# Patient Record
Sex: MALE | Race: BLACK OR AFRICAN AMERICAN | Employment: OTHER | URBAN - METROPOLITAN AREA
[De-identification: names, ages, dates, MRNs, and addresses within clinical notes are randomized per-mention and may not be internally consistent; named-entity substitution may affect disease eponyms.]

---

## 2017-01-08 ENCOUNTER — APPOINTMENT (OUTPATIENT)
Dept: GENERAL RADIOLOGY | Age: 82
End: 2017-01-08
Attending: EMERGENCY MEDICINE
Payer: MEDICARE

## 2017-01-08 ENCOUNTER — HOSPITAL ENCOUNTER (EMERGENCY)
Age: 82
Discharge: HOME | End: 2017-01-08
Attending: EMERGENCY MEDICINE | Admitting: EMERGENCY MEDICINE
Payer: MEDICARE

## 2017-01-08 ENCOUNTER — APPOINTMENT (OUTPATIENT)
Dept: CT IMAGING | Age: 82
End: 2017-01-08
Attending: EMERGENCY MEDICINE
Payer: MEDICARE

## 2017-01-08 VITALS
RESPIRATION RATE: 16 BRPM | TEMPERATURE: 97.8 F | DIASTOLIC BLOOD PRESSURE: 74 MMHG | HEART RATE: 72 BPM | SYSTOLIC BLOOD PRESSURE: 132 MMHG | OXYGEN SATURATION: 99 %

## 2017-01-08 DIAGNOSIS — S72.112A CLOSED AVULSION FRACTURE OF GREATER TROCHANTER OF FEMUR, LEFT, INITIAL ENCOUNTER (HCC): Primary | ICD-10-CM

## 2017-01-08 PROCEDURE — 99284 EMERGENCY DEPT VISIT MOD MDM: CPT

## 2017-01-08 PROCEDURE — 73502 X-RAY EXAM HIP UNI 2-3 VIEWS: CPT

## 2017-01-08 PROCEDURE — 73700 CT LOWER EXTREMITY W/O DYE: CPT

## 2017-01-08 PROCEDURE — 74011250637 HC RX REV CODE- 250/637: Performed by: EMERGENCY MEDICINE

## 2017-01-08 RX ORDER — HYDROCODONE BITARTRATE AND ACETAMINOPHEN 5; 325 MG/1; MG/1
1 TABLET ORAL
Status: COMPLETED | OUTPATIENT
Start: 2017-01-08 | End: 2017-01-08

## 2017-01-08 RX ORDER — OXYCODONE AND ACETAMINOPHEN 5; 325 MG/1; MG/1
1 TABLET ORAL
Qty: 30 TAB | Refills: 0 | Status: SHIPPED | OUTPATIENT
Start: 2017-01-08 | End: 2017-01-15

## 2017-01-08 RX ADMIN — HYDROCODONE BITARTRATE AND ACETAMINOPHEN 1 TABLET: 5; 325 TABLET ORAL at 15:32

## 2017-01-08 NOTE — DISCHARGE INSTRUCTIONS
Learning About How to Use a [address] Instructions  Using a walker can help you move with less pain and more stability. A walker can help you be more independent and safe as you do your daily activities. Be sure your walker fits you. When you stand up in your normal posture and rest your hands on the walker's hand , your hands should be even with the tops of your legs. Your elbows should be slightly bent. To stay safe when using a walker:  · Look straight ahead, not down at your feet. · Clear away small rugs, cords, or anything else that could cause you to trip, slip, or fall. · Be very careful around pets and small children. They can get in your path when you least expect it. · Be sure the rubber tips on your walker are clean and in good condition to help prevent slipping. · Avoid slick conditions, such as wet floors and snowy or icy driveways. In bad weather, be especially careful on curbs and steps. How to use a walker  Getting started    1. Set the walker at arm's length in front of you, with all four legs on the floor. If your walker has wheels on the front legs, push the walker forward so it's an arm's length in front of you. Walking with a walker    1. Use the handles of the walker for balance as you move your weak or injured leg forward to the middle area of the walker. Don't step all the way to the front. 2. Push straight down on the handles of the walker as you bring your strong leg up, so it is even with your injured leg. 3. Repeat. Getting out of a chair and sitting down    1. To get out of a chair, use both hands and push against the arms of your chair. Then put both hands on your walker. 2. Don't use your walker to help you stand up or sit down. 3. To sit, back up to the chair. Touch the back of your legs to the chair. 4. Support most of your weight on your strong leg, and reach back for the arms of the chair.   5. Slowly and carefully lower yourself into the chair.  Going up and down a curb    The first few times you try this, have another person nearby to steady you if needed. 1. Stand as close to the edge as you can while keeping all four legs of the walker on the surface you're standing on.  2. When you have your balance, move the walker up or down to the surface you are moving to. 3. Push straight down on the handles for balance and to take weight off your injured leg. 4. If you are going up, step up with your stronger leg first, and then bring your weaker or injured leg up to meet it. If you are going down, step down with your weaker leg first, and then bring your stronger leg down to meet it. (Remember \"up with the good, and down with the bad\" to help you lead with the correct leg.)  5. Get your balance again before you start to walk. Follow-up care is a key part of your treatment and safety. Be sure to make and go to all appointments, and call your doctor if you are having problems. It's also a good idea to know your test results and keep a list of the medicines you take. Where can you learn more? Go to http://tani-haydee.info/. Enter M137 in the search box to learn more about \"Learning About How to Use a Walker. \"  Current as of: August 4, 2016  Content Version: 11.1  © 9994-8137 [employer]. Care instructions adapted under license by [employer] (which disclaims liability or warranty for this information). If you have questions about a medical condition or this instruction, always ask your healthcare professional. [place] [address] any warranty or liability for your use of this information. Hip Pain: Care Instructions  Your Care Instructions  Hip pain may be caused by many things, including overuse, a fall, or a twisting movement. Another cause of hip pain is arthritis. Your pain may increase when you stand up, walk, or squat. The pain may come and go or may be constant.   Home treatment can help relieve hip pain, swelling, and stiffness. If your pain is ongoing, you may need more tests and treatment. Follow-up care is a key part of your treatment and safety. Be sure to make and go to all appointments, and call your doctor if you are having problems. Its also a good idea to know your test results and keep a list of the medicines you take. How can you care for yourself at home? · Take pain medicines exactly as directed. ¨ If the doctor gave you a prescription medicine for pain, take it as prescribed. ¨ If you are not taking a prescription pain medicine, ask your doctor if you can take an over-the-counter medicine. · Rest and protect your hip. Take a break from any activity, including standing or walking, that may cause pain. · Put ice or a cold pack against your hip for 10 to 20 minutes at a time. Try to do this every 1 to 2 hours for the next 3 days (when you are awake) or until the swelling goes down. Put a thin cloth between the ice and your skin. · Sleep on your healthy side with a pillow between your knees, or sleep on your back with pillows under your knees. · If there is no swelling, you can put moist heat, a heating pad, or a warm cloth on your hip. Do gentle stretching exercises to help keep your hip flexible. · Learn how to prevent falls. Have your vision and hearing checked regularly. Wear slippers or shoes with a nonskid sole. · Stay at a healthy weight. · Wear comfortable shoes. When should you call for help? Call 911 anytime you think you may need emergency care. For example, call if:  · You have sudden chest pain and shortness of breath, or you cough up blood. · You are not able to stand or walk or bear weight. · Your buttocks, legs, or feet feel numb or tingly. · Your leg or foot is cool or pale or changes color. · You have severe pain.   Call your doctor now or seek immediate medical care if:  · You have signs of infection, such as:  ¨ Increased pain, swelling, warmth, or redness in the hip area. ¨ Red streaks leading from the hip area. ¨ Pus draining from the hip area. ¨ A fever. · You have signs of a blood clot, such as:  ¨ Pain in your calf, back of the knee, thigh, or groin. ¨ Redness and swelling in your leg or groin. · You are not able to bend, straighten, or move your leg normally. · You have trouble urinating or having bowel movements. Watch closely for changes in your health, and be sure to contact your doctor if:  · You do not get better as expected. Where can you learn more? Go to http://tani-haydee.info/. Enter W651 in the search box to learn more about \"Hip Pain: Care Instructions. \"  Current as of: May 27, 2016  Content Version: 11.1  © 8889-1658 [employer]. Care instructions adapted under license by [employer] (which disclaims liability or warranty for this information). If you have questions about a medical condition or this instruction, always ask your healthcare professional. [address] any warranty or liability for your use of this information.

## 2017-01-08 NOTE — ED TRIAGE NOTES
Per EMS, \"he slipped and fell outside his apartment complex and is complaining of left hip pain. No deformity noted, ambulated back inside and called us. \"

## 2017-01-08 NOTE — ED NOTES
Pt taken home by [facility] in [facility] at this time, A&O 3. I have reviewed discharge instructions with the patient. The patient verbalized understanding. Patient armband removed and given to patient to take home. Patient was informed of the privacy risks if armband lost or stolen.

## 2017-01-08 NOTE — ED PROVIDER NOTES
HPI Comments: 3:20 PM Anna Hong is a 80 y.o. male presenting to the ED via EMS with L hip pain secondary to a fall that occurred today. The patient states he slipped on ice and fell on the hip but he was able to get up and walk to his home to call for help. The patient rates his pain as 5/10. Pt denies LOC, nausea, vomiting, diarrhea, fever, CP, and cough. No other complaints at this time. The history is provided by the patient. History reviewed. No pertinent past medical history. History reviewed. No pertinent past surgical history. History reviewed. No pertinent family history. Social History     Social History    Marital status:      Spouse name: N/A    Number of children: N/A    Years of education: N/A     Occupational History    Not on file. Social History Main Topics    Smoking status: Never Smoker    Smokeless tobacco: Not on file    Alcohol use No    Drug use: Not on file    Sexual activity: Not on file     Other Topics Concern    Not on file     Social History Narrative         ALLERGIES: Review of patient's allergies indicates no known allergies. Review of Systems   Constitutional: Negative for appetite change, chills, diaphoresis, fatigue, fever and unexpected weight change. HENT: Negative for congestion, dental problem, ear discharge, ear pain, hearing loss, nosebleeds, rhinorrhea, sinus pressure, sore throat, tinnitus, trouble swallowing and voice change. Eyes: Negative for photophobia, pain, discharge, redness and visual disturbance. Respiratory: Negative for cough, choking, chest tightness, shortness of breath, wheezing and stridor. Cardiovascular: Negative for chest pain, palpitations and leg swelling. Gastrointestinal: Negative for abdominal distention, abdominal pain, anal bleeding, blood in stool, constipation, diarrhea, nausea and vomiting.    Genitourinary: Negative for decreased urine volume, difficulty urinating, discharge, dysuria, flank pain, frequency, genital sores, hematuria, penile pain, penile swelling, scrotal swelling, testicular pain and urgency. Musculoskeletal: Negative for neck pain and neck stiffness. L hip pain   Neurological: Negative for tremors, seizures, syncope, speech difficulty, weakness, light-headedness and headaches. Hematological: Negative for adenopathy. Does not bruise/bleed easily. Psychiatric/Behavioral: Negative for agitation, behavioral problems, confusion, hallucinations, self-injury, sleep disturbance and suicidal ideas. The patient is not nervous/anxious and is not hyperactive. Vitals:    01/08/17 1526   BP: 144/78   Pulse: 78   Resp: 16   Temp: 97.8 °F (36.6 °C)   SpO2: 98%            Physical Exam   Constitutional: He is oriented to person, place, and time. He appears well-developed and well-nourished. He appears distressed. 80year old  male in mild painful distress. HENT:   Head: Normocephalic and atraumatic. Right Ear: External ear normal.   Left Ear: External ear normal.   Nose: Nose normal.   Mouth/Throat: Oropharynx is clear and moist. No oropharyngeal exudate. Eyes: Conjunctivae and EOM are normal. Pupils are equal, round, and reactive to light. Right eye exhibits no discharge. Left eye exhibits no discharge. No scleral icterus. Neck: Normal range of motion. Neck supple. No JVD present. No tracheal deviation present. No thyromegaly present. Cardiovascular: Normal rate, regular rhythm, normal heart sounds and intact distal pulses. Exam reveals no gallop and no friction rub. No murmur heard. Pulmonary/Chest: Effort normal and breath sounds normal. No stridor. No respiratory distress. He has no wheezes. He has no rales. He exhibits no tenderness. Abdominal: Soft. Bowel sounds are normal. He exhibits no distension and no mass. There is no tenderness. There is no rebound and no guarding. Musculoskeletal: Normal range of motion.  He exhibits tenderness. He exhibits no edema or deformity. Mild left lateral hip tenderness   Lymphadenopathy:     He has no cervical adenopathy. Neurological: He is alert and oriented to person, place, and time. No cranial nerve deficit. He exhibits normal muscle tone. Coordination normal.   Skin: Skin is warm and dry. No rash noted. He is not diaphoretic. No erythema. No pallor. Psychiatric: He has a normal mood and affect. His behavior is normal. Judgment and thought content normal.   Nursing note and vitals reviewed. MDM  Number of Diagnoses or Management Options  Diagnosis management comments: Differential includes:  Contusion, Fraction, Dislocation unlikely. Amount and/or Complexity of Data Reviewed  Tests in the radiology section of CPT®: ordered and reviewed  Tests in the medicine section of CPT®: ordered and reviewed  Decide to obtain previous medical records or to obtain history from someone other than the patient: yes  Review and summarize past medical records: yes  Independent visualization of images, tracings, or specimens: yes    Risk of Complications, Morbidity, and/or Mortality  Presenting problems: moderate  Diagnostic procedures: moderate  Management options: moderate    Patient Progress  Patient progress: stable    ED Course       Procedures    -------------------------------------------------------------------------------------------------------------------  PROGRESS NOTE:  5:51 PM Pt reevaluated at this time and is resting comfortably in NAD. Discussed results and findings, as well as, diagnosis and plan for discharge. Pt verbalizes understanding and agreement with plan. All questions addressed at this time. CONSULTATIONS:  4:56 PM Jolie Borja DO discussed care with Hanny Weems ([facility]). Standard discussion; including history of patients chief complaint, available diagnostic results, and treatment course.  Dr. Jamie Larry also states that based on the area of involvement the fracture could involve the femoral neck so he request's CT scan.    5:50 PM Rose Osman DO discussed care with Jaylon Hodges (Ortho). Standard discussion; including history of patients chief complaint, available diagnostic results, and treatment course. Patient may follow up in the Orthopedic office and should be discharge home with a walker, and instruction to be toe touch with walker until he's seen in the office. ORDERS:  Orders Placed This Encounter    XR HIP LT W OR WO PELV 2-3 VWS    CT HIP LT WO CONT    CBC WITH AUTOMATED DIFF    METABOLIC PANEL, BASIC    HEPATIC FUNCTION PANEL    WALKER STANDARD    HYDROcodone-acetaminophen (NORCO) 5-325 mg per tablet 1 Tab    oxyCODONE-acetaminophen (PERCOCET) 5-325 mg per tablet    IP CONSULT TO ORTHOPEDIC SURGERY           RADIOLOGY RESULTS:    XR HIP LT W OR WO PELV 2-3 VWS      Nondisplaced greater trochanter fracture of the L hip  Per Rose Osman DO    CT HIP LT WO CONT     Transverse minimally displaced fracture through the greater trochanter without involvement of the femoral neck. Per Radiology       Final result  IMPRESSION:      Nondisplaced horizontally oriented fracture through the posterior aspect of the  left greater trochanter, corresponding to the gluteus medius tendon insertion. No significant avulsion or tendon retraction although there is mild  peritendinous edema. No femoral neck involvement.     Mild additional chronic/degenerative changes of the left hip as above.      Preliminary report provided by on-call radiology resident. DISPOSITION:  Diagnosis:   1.  Closed avulsion fracture of greater trochanter of femur, left, initial encounter [facility])            Disposition: Discharged      Follow-up Information     Follow up With Details Comments Contact Info    Helio Jaffe MD Schedule an appointment as soon as possible for a visit Return to ED, If symptoms worsen [address] [facility].  [phone]                   Patient's Medications   Start Taking    OXYCODONE-ACETAMINOPHEN (PERCOCET) 5-325 MG PER TABLET    Take 1 Tab by mouth every six (6) hours as needed for Pain (1 to 2 tablets) for up to 7 days. Max Daily Amount: 4 Tabs. Continue Taking    No medications on file   These Medications have changed    No medications on file   Stop Taking    HYDROCODONE-ACETAMINOPHEN (NORCO) 5-325 MG PER TABLET    Take 1 Tab by mouth every four (4) hours as needed for Pain. Max Daily Amount: 6 Tabs.         -------------------------------------------------------------------------------------------------------------------  IHernando, am scribing for and in the presence of Jolie Borja DO. Signed by: Katelyn Encarnacion, 01/08/17, 3:24 PM        Provider Attestation:  I personally performed the services described in the documentation, reviewed the documentation, as recorded by the scribe in my presence, and it accurately and completely records my words and actions. Dr. Robbie Pena.  Robin NUNEZ 3:24 PM

## 2021-01-13 ENCOUNTER — APPOINTMENT (OUTPATIENT)
Dept: GENERAL RADIOLOGY | Age: 86
End: 2021-01-13
Attending: PHYSICIAN ASSISTANT
Payer: MEDICARE

## 2021-01-13 ENCOUNTER — HOSPITAL ENCOUNTER (EMERGENCY)
Age: 86
Discharge: HOME | End: 2021-01-13
Attending: EMERGENCY MEDICINE
Payer: MEDICARE

## 2021-01-13 ENCOUNTER — APPOINTMENT (OUTPATIENT)
Dept: GENERAL RADIOLOGY | Age: 86
End: 2021-01-13
Attending: EMERGENCY MEDICINE
Payer: MEDICARE

## 2021-01-13 ENCOUNTER — APPOINTMENT (OUTPATIENT)
Dept: CT IMAGING | Age: 86
End: 2021-01-13
Attending: PHYSICIAN ASSISTANT
Payer: MEDICARE

## 2021-01-13 VITALS
OXYGEN SATURATION: 99 % | RESPIRATION RATE: 16 BRPM | TEMPERATURE: 97.5 F | DIASTOLIC BLOOD PRESSURE: 90 MMHG | HEART RATE: 59 BPM | SYSTOLIC BLOOD PRESSURE: 140 MMHG

## 2021-01-13 DIAGNOSIS — N39.0 URINARY TRACT INFECTION WITHOUT HEMATURIA, SITE UNSPECIFIED: Primary | ICD-10-CM

## 2021-01-13 LAB
ANION GAP SERPL CALC-SCNC: 4 MMOL/L (ref 3–18)
APPEARANCE UR: ABNORMAL
BACTERIA URNS QL MICRO: ABNORMAL /HPF
BASOPHILS # BLD: 0 K/UL (ref 0–0.1)
BASOPHILS NFR BLD: 0 % (ref 0–2)
BILIRUB UR QL: ABNORMAL
BUN SERPL-MCNC: 17 MG/DL (ref 7–18)
BUN/CREAT SERPL: 13 (ref 12–20)
CALCIUM SERPL-MCNC: 8.6 MG/DL (ref 8.5–10.1)
CHLORIDE SERPL-SCNC: 101 MMOL/L (ref 100–111)
CK MB CFR SERPL CALC: NORMAL % (ref 0–4)
CK MB SERPL-MCNC: <1 NG/ML (ref 5–25)
CK SERPL-CCNC: 45 U/L (ref 39–308)
CO2 SERPL-SCNC: 31 MMOL/L (ref 21–32)
COLOR UR: ABNORMAL
CREAT SERPL-MCNC: 1.34 MG/DL (ref 0.6–1.3)
DIFFERENTIAL METHOD BLD: ABNORMAL
EOSINOPHIL # BLD: 0 K/UL (ref 0–0.4)
EOSINOPHIL NFR BLD: 1 % (ref 0–5)
EPITH CASTS URNS QL MICRO: ABNORMAL /LPF (ref 0–5)
ERYTHROCYTE [DISTWIDTH] IN BLOOD BY AUTOMATED COUNT: 13.9 % (ref 11.6–14.5)
GLUCOSE SERPL-MCNC: 98 MG/DL (ref 74–99)
GLUCOSE UR STRIP.AUTO-MCNC: NEGATIVE MG/DL
HCT VFR BLD AUTO: 42.4 % (ref 36–48)
HGB BLD-MCNC: 13.4 G/DL (ref 13–16)
HGB UR QL STRIP: NEGATIVE
KETONES UR QL STRIP.AUTO: ABNORMAL MG/DL
LEUKOCYTE ESTERASE UR QL STRIP.AUTO: ABNORMAL
LYMPHOCYTES # BLD: 1.3 K/UL (ref 0.9–3.6)
LYMPHOCYTES NFR BLD: 42 % (ref 21–52)
MCH RBC QN AUTO: 29.1 PG (ref 24–34)
MCHC RBC AUTO-ENTMCNC: 31.6 G/DL (ref 31–37)
MCV RBC AUTO: 92.2 FL (ref 74–97)
MONOCYTES # BLD: 0.2 K/UL (ref 0.05–1.2)
MONOCYTES NFR BLD: 6 % (ref 3–10)
MUCOUS THREADS URNS QL MICRO: ABNORMAL /LPF
NEUTS SEG # BLD: 1.6 K/UL (ref 1.8–8)
NEUTS SEG NFR BLD: 51 % (ref 40–73)
NITRITE UR QL STRIP.AUTO: NEGATIVE
PH UR STRIP: 5 [PH] (ref 5–8)
PLATELET # BLD AUTO: 165 K/UL (ref 135–420)
PMV BLD AUTO: 10.5 FL (ref 9.2–11.8)
POTASSIUM SERPL-SCNC: 3.5 MMOL/L (ref 3.5–5.5)
PROT UR STRIP-MCNC: 30 MG/DL
RBC # BLD AUTO: 4.6 M/UL (ref 4.7–5.5)
RBC #/AREA URNS HPF: NEGATIVE /HPF (ref 0–5)
SODIUM SERPL-SCNC: 136 MMOL/L (ref 136–145)
SP GR UR REFRACTOMETRY: >1.03 (ref 1–1.03)
TROPONIN I SERPL-MCNC: <0.02 NG/ML (ref 0–0.04)
UROBILINOGEN UR QL STRIP.AUTO: 1 EU/DL (ref 0.2–1)
WBC # BLD AUTO: 3.1 K/UL (ref 4.6–13.2)
WBC URNS QL MICRO: ABNORMAL /HPF (ref 0–4)

## 2021-01-13 PROCEDURE — 99283 EMERGENCY DEPT VISIT LOW MDM: CPT

## 2021-01-13 PROCEDURE — 82553 CREATINE MB FRACTION: CPT

## 2021-01-13 PROCEDURE — 71045 X-RAY EXAM CHEST 1 VIEW: CPT

## 2021-01-13 PROCEDURE — 93005 ELECTROCARDIOGRAM TRACING: CPT

## 2021-01-13 PROCEDURE — 80048 BASIC METABOLIC PNL TOTAL CA: CPT

## 2021-01-13 PROCEDURE — 70450 CT HEAD/BRAIN W/O DYE: CPT

## 2021-01-13 PROCEDURE — 81001 URINALYSIS AUTO W/SCOPE: CPT

## 2021-01-13 PROCEDURE — 85025 COMPLETE CBC W/AUTO DIFF WBC: CPT

## 2021-01-13 RX ORDER — CEPHALEXIN 500 MG/1
500 CAPSULE ORAL 4 TIMES DAILY
Qty: 28 CAP | Refills: 0 | Status: SHIPPED | OUTPATIENT
Start: 2021-01-13 | End: 2021-01-20

## 2021-01-13 NOTE — ED TRIAGE NOTES
Weak and dizzy for 3 weeks. Has caretaker with to speak for patient.    Caretaker states he has not seen doctor

## 2021-01-14 LAB
ATRIAL RATE: 54 BPM
CALCULATED P AXIS, ECG09: 72 DEGREES
CALCULATED R AXIS, ECG10: 67 DEGREES
CALCULATED T AXIS, ECG11: 22 DEGREES
DIAGNOSIS, 93000: NORMAL
P-R INTERVAL, ECG05: 156 MS
Q-T INTERVAL, ECG07: 446 MS
QRS DURATION, ECG06: 104 MS
QTC CALCULATION (BEZET), ECG08: 422 MS
VENTRICULAR RATE, ECG03: 54 BPM

## 2021-01-15 NOTE — ED PROVIDER NOTES
EMERGENCY DEPARTMENT HISTORY AND PHYSICAL EXAM    Date: 1/13/2021  Patient Name: Sharron Vasques    History of Presenting Illness     Chief Complaint   Patient presents with    Dizziness    Fatigue         History Provided By: patient        Additional History (Context): Sharron Vasques is a 79yo M with h/o dementia brought to ED by caretaker for \"dizziness and fatigue\". By the time I evaluated pt, caretaker had left ED therefore history is limited. Pt has no complaints when asked why he is in ED. Has no pain, dizziness or any other complaints. PCP: None    Current Outpatient Medications   Medication Sig Dispense Refill    cephALEXin (Keflex) 500 mg capsule Take 1 Cap by mouth four (4) times daily for 7 days. 28 Cap 0       Past History     Past Medical History:  History reviewed. No pertinent past medical history. Past Surgical History:  History reviewed. No pertinent surgical history. Family History:  History reviewed. No pertinent family history. Social History:  Social History     Tobacco Use    Smoking status: Never Smoker   Substance Use Topics    Alcohol use: No    Drug use: Not on file       Allergies:  No Known Allergies      Review of Systems       Review of Systems   Constitutional: Negative for chills and fever. HENT: Negative for nasal congestion, sore throat, rhinorrhea  Eyes: Negative. Respiratory: Negative for cough and negative for shortness of breath. Cardiovascular: Negative for chest pain and palpitations. Gastrointestinal: Negative for abdominal pain, constipation, diarrhea, nausea and vomiting. Genitourinary: Negative. Negative for difficulty urinating and flank pain. Musculoskeletal: Negative for back pain. Negative for gait problem and neck pain. Skin: Negative. Allergic/Immunologic: Negative. Neurological: Negative for dizziness, weakness, numbness and headaches. Psychiatric/Behavioral: Negative.     All other systems reviewed and are negative. All Other Systems Negative  Physical Exam     Vitals:    01/13/21 1649   BP: (!) 140/90   Pulse: (!) 59   Resp: 16   Temp: 97.5 °F (36.4 °C)   SpO2: 99%     Physical Exam  Vitals signs and nursing note reviewed. Constitutional:       General: He is not in acute distress. Appearance: Normal appearance. He is well-developed. He is not ill-appearing, toxic-appearing or diaphoretic. Comments: Chronically ill   HENT:      Head: Normocephalic and atraumatic. Nose: Nose normal.   Eyes:      Conjunctiva/sclera: Conjunctivae normal.      Pupils: Pupils are equal, round, and reactive to light. Neck:      Musculoskeletal: Normal range of motion and neck supple. Cardiovascular:      Rate and Rhythm: Normal rate and regular rhythm. Pulses: Normal pulses. Pulmonary:      Effort: Pulmonary effort is normal. No respiratory distress. Breath sounds: Normal breath sounds. No wheezing. Abdominal:      General: There is no distension. Palpations: Abdomen is soft. Tenderness: There is no abdominal tenderness. There is no right CVA tenderness or left CVA tenderness. Musculoskeletal: Normal range of motion. Skin:     General: Skin is warm. Findings: No rash. Neurological:      General: No focal deficit present. Mental Status: He is alert and oriented to person, place, and time. Cranial Nerves: No cranial nerve deficit. Coordination: Coordination normal.   Psychiatric:         Behavior: Behavior normal.           Diagnostic Study Results     Labs -   No results found for this or any previous visit (from the past 12 hour(s)). Radiologic Studies -   CT HEAD WO CONT   Final Result   IMPRESSION:      Normal for age. Nothing acute. XR CHEST PORT   Final Result   IMPRESSION:      No active cardiopulmonary disease. No change from roughly 1 hour earlier. XR CHEST PORT   Final Result   IMPRESSION:      1. No evidence of active cardiopulmonary disease. CT Results  (Last 48 hours)               01/13/21 1700  CT HEAD WO CONT Final result    Impression:  IMPRESSION:       Normal for age. Nothing acute. Narrative:  EXAM: CT HEAD WO CONT       CLINICAL INDICATION/HISTORY: dizziness     > Additional: None       COMPARISON: None.     > Correlative imaging: None. TECHNIQUE: Volumetric scanning without IV contrast.  Sagittal and coronal   reconstructions. One or more dose reduction techniques were used on this CT:   automated exposure control, adjustment of the mAs and/or kVp according to   patient size, and iterative reconstruction techniques. The specific techniques   used on this CT exam have been documented in the patient's electronic medical   record. Digital imaging and communications in medicine (DICOM) format image data   are available to nonaffiliated external healthcare facilities or entities on a   secure, media free, reciprocally searchable basis with patient authorization for   at least a 12 month period after this study. _______________       FINDINGS:       BRAIN:      > Brain volume: Age appropriate.      > White matter disease:       >> Severity: Relatively mild. >> Relative to age: Age appropriate.     > Infarcts, encephalomalacia: None.     > Parenchymal mass: None.      > Parenchymal hemorrhage: None.      > Midline shift: None.      > Miscellaneous: None. EXTRA-AXIAL SPACES: Unremarkable. No fluid collections. CALVARIUM: Intact. SINUSES, MASTOIDS: Clear. OTHER EXTRACRANIAL: Unremarkable.       _______________               CXR Results  (Last 48 hours)               01/13/21 1652  XR CHEST PORT Final result    Impression:  IMPRESSION:       No active cardiopulmonary disease. No change from roughly 1 hour earlier.        Narrative:  EXAM: CHEST RADIOGRAPH       CLINICAL INDICATION/HISTORY: chest pain, sob, and/or arrhythmia   -Additional: None       COMPARISON: Same day 1532 hours       TECHNIQUE: Portable frontal view of the chest       _______________       FINDINGS:       SUPPORT DEVICES: None. HEART AND MEDIASTINUM: No appreciable cardiomegaly. Remaining mediastinal   contours within normal limits. LUNGS AND PLEURAL SPACES: Clear. No consolidation, mass or effusion. Noted   calcified pleural plaque along the right hemidiaphragm. BONY THORAX AND SOFT TISSUES: Unremarkable.       _______________           01/13/21 1455  XR CHEST PORT Final result    Impression:  IMPRESSION:       1. No evidence of active cardiopulmonary disease. Narrative:  EXAM: Portable frontal view of the chest.       CLINICAL INDICATION/HISTORY: Weakness and dizziness, fatigue       COMPARISON: None.       _______________       FINDINGS:        Normal mediastinal and cardiac silhouettes. Lungs are clear. Minimal linear   calcification at the right diaphragm. No pleural effusion or pneumothorax. Chronic appearing posterior right upper rib fractures. _______________                   Medical Decision Making   I am the first provider for this patient. I reviewed the vital signs, available nursing notes, past medical history, past surgical history, family history and social history. Vital Signs-Reviewed the patient's vital signs. Records Reviewed: Nursing notes, old medical records and any previous labs, imaging, visits, consultations pertinent to patient care    Procedures:  Procedures      ED Course: Progress Notes, Reevaluation, and Consults:      Provider Notes (Medical Decision Making):   Pt is alert oriented x3 however when asked why he is in ED, he has no complaints. I attempted to call caretaker to get history and reason for his visit but no answer. This was attempted over 5 times throughout pt's ed stay. Exam is unremarkable, pt appears well and non toxic. He denies pain or any sx. Labs, ct and cxr wnl. ua with small amount of wbc, due to age and dementia, will cover with abx.  I again attempted to call caretaker prior to completion of care, no answer. Will provide pt with a Rx as there is no documented pharmacy in the system and he does not have one to f/u with. Will continue to attempt to speak with caretaker as she is the only contact on his chart. MED RECONCILIATION:  No current facility-administered medications for this encounter. Current Outpatient Medications   Medication Sig    cephALEXin (Keflex) 500 mg capsule Take 1 Cap by mouth four (4) times daily for 7 days. Disposition:  home    DISCHARGE NOTE:     Pt has been reexamined. Patient has no new complaints, changes, or physical findings. Care plan outlined and precautions discussed. Discussed proper way to take medications. Discussed treatment plan, return precautions, symptomatic relief, and expected time to improvement. All questions answered. Patient is stable for discharge and outpatient management. Patient is ready to go home. Follow-up Information     Follow up With Specialties Details Why Contact Info    [address] [place] [address] EMERGENCY DEPT Emergency Medicine   [address] [place] [address]  [phone]          Discharge Medication List as of 1/13/2021  7:00 PM                Diagnosis     Clinical Impression:   1. Urinary tract infection without hematuria, site unspecified            Dictation disclaimer:  Please note that this dictation was completed with WriteOn, the computer voice recognition software. Quite often unanticipated grammatical, syntax, homophones, and other interpretive errors are inadvertently transcribed by the computer software. Please disregard these errors. Please excuse any errors that have escaped final proofreading.